# Patient Record
(demographics unavailable — no encounter records)

---

## 2024-11-14 NOTE — DISCUSSION/SUMMARY
[Improving] : improving [GERD] : gastroesophageal reflux disease [Hyperlipidemia] : hyperlipidemia [Lipids Test Panel] : a fasting lipid profile [Hypertension] : hypertension [Stable] : stable [Responding to Treatment] : responding to treatment [Echocardiogram] : echocardiogram [None] : There are no changes in medication management [Patient] : the patient [de-identified] : declines stress testing [FreeTextEntry2] : pt with aide

## 2024-11-14 NOTE — PHYSICAL EXAM
[General Appearance - Well Developed] : well developed [Normal Appearance] : normal appearance [Well Groomed] : well groomed [General Appearance - Well Nourished] : well nourished [No Deformities] : no deformities [General Appearance - In No Acute Distress] : no acute distress [Normal Conjunctiva] : the conjunctiva exhibited no abnormalities [Eyelids - No Xanthelasma] : the eyelids demonstrated no xanthelasmas [Normal Oral Mucosa] : normal oral mucosa [No Oral Pallor] : no oral pallor [No Oral Cyanosis] : no oral cyanosis [Normal Jugular Venous A Waves Present] : normal jugular venous A waves present [Normal Jugular Venous V Waves Present] : normal jugular venous V waves present [No Jugular Venous Bueno A Waves] : no jugular venous bueno A waves [Respiration, Rhythm And Depth] : normal respiratory rhythm and effort [Exaggerated Use Of Accessory Muscles For Inspiration] : no accessory muscle use [Auscultation Breath Sounds / Voice Sounds] : lungs were clear to auscultation bilaterally [Abdomen Soft] : soft [Abdomen Tenderness] : non-tender [Abdomen Mass (___ Cm)] : no abdominal mass palpated [Abnormal Walk] : normal gait [Gait - Sufficient For Exercise Testing] : the gait was sufficient for exercise testing [Nail Clubbing] : no clubbing of the fingernails [Cyanosis, Localized] : no localized cyanosis [Petechial Hemorrhages (___cm)] : no petechial hemorrhages [Skin Color & Pigmentation] : normal skin color and pigmentation [] : no rash [No Venous Stasis] : no venous stasis [Skin Lesions] : no skin lesions [No Skin Ulcers] : no skin ulcer [No Xanthoma] : no  xanthoma was observed [Oriented To Time, Place, And Person] : oriented to person, place, and time [Affect] : the affect was normal [Mood] : the mood was normal [No Anxiety] : not feeling anxious [Normal Rate] : normal [Normal S1] : normal S1 [Normal S2] : normal S2 [No Murmur] : no murmurs heard [2+] : left 2+ [No Abnormalities] : the abdominal aorta was not enlarged and no bruit was heard [No Pitting Edema] : no pitting edema present [S3] : no S3 [S4] : no S4 [Right Carotid Bruit] : no bruit heard over the right carotid [Left Carotid Bruit] : no bruit heard over the left carotid [Right Femoral Bruit] : no bruit heard over the right femoral artery [Left Femoral Bruit] : no bruit heard over the left femoral artery

## 2024-11-14 NOTE — REASON FOR VISIT
[Follow-Up - Clinic] : a clinic follow-up of [Abnormal ECG] : an abnormal ECG [Hypertension] : hypertension [FreeTextEntry2] : pt h/o gerd, gi/epigastric upset after acidic foods, after salty foods,in past, helps after burp, no recent complaints, fatigue with generic toprol, no new sx, pt s/p mechanical fall, leg surgery, now s/p hip surgery, feels well [FreeTextEntry1] :  no cp or sob

## 2024-11-14 NOTE — DISCUSSION/SUMMARY
[Improving] : improving [GERD] : gastroesophageal reflux disease [Hyperlipidemia] : hyperlipidemia [Lipids Test Panel] : a fasting lipid profile [Hypertension] : hypertension [Stable] : stable [Responding to Treatment] : responding to treatment [Echocardiogram] : echocardiogram [None] : There are no changes in medication management [Patient] : the patient [de-identified] : declines stress testing [FreeTextEntry2] : pt with aide

## 2024-12-18 NOTE — HISTORY OF PRESENT ILLNESS
[FreeTextEntry1] : f/u biopsy. No complaints. "All better." [de-identified] : Compliant with wound care. mellissa Trent.

## 2024-12-18 NOTE — ASSESSMENT
[FreeTextEntry1] : Alert, oriented, well, pleasant.  Significant improvement with absence of thick keratotic plaques. Pink healing wounds. No lymphadenopathy. SCC treatment options. Did not want to hear them. "Do I have to treat this? I'm 92." Discussed desiccation and curettage.  "Don't tell me anything else." Side effects discussed with patient.  Agrees to "simple" treatment but after the holidays. Will schedule appointment for January. Will treat 1/2 of area then treat the remainder 1 month later.

## 2025-02-06 NOTE — ASSESSMENT
[FreeTextEntry1] : Alert, oriented, well, pleasant.  Well healing scar. No lymphadenopathy, left forehead/scalp. Medial superior edge in hairline is 3x7mm clean yellow erosion with trapped hair. Granulation tissue. Cleansed with peroxide.  Debrided and hair cut back and away from wound. Aluminum chloride application. discontinue petrolatum. start benzoyl peroxide cream once per day for 1 month. f/u 1 month.

## 2025-03-06 NOTE — ASSESSMENT
[FreeTextEntry1] : Alert, oriented, well, pleasant.  pink scar left forehead without evidence recurrence or erosion. No lymphadenopathy. Granulation tissue resolved. discontinue topical treatments.  f/u 6 months for skin check.

## 2025-03-06 NOTE — HISTORY OF PRESENT ILLNESS
[FreeTextEntry1] : f/u scc left forehead. Denies pain or swelling after d&c. Erosion 1 month ago. "All gone." With son.

## 2025-03-10 NOTE — ASSESSMENT
[FreeTextEntry1] : 91 yo F PMH HTN, HLD, Vitamin D def, DM2, GERD, osteoporosis presenting today for follow-up.

## 2025-03-10 NOTE — HISTORY OF PRESENT ILLNESS
[FreeTextEntry1] : follow-up   [de-identified] : 93 yo F PMH HTN, HLD, Vitamin D def, DM2, GERD, osteoporosis presenting today for follow-up.    She is accompanied by her daughter, Ariadne.   She has seasonal allergies.   She has follow-up with ophthalmologist today for changes in vision.   She has been doing PT which is helping.

## 2025-03-10 NOTE — PHYSICAL EXAM
[Normal] : no acute distress, well nourished, well developed and well-appearing [Normal Oropharynx] : the oropharynx was normal [No Respiratory Distress] : no respiratory distress  [Normal Rate] : normal rate

## 2025-03-10 NOTE — ASSESSMENT
[FreeTextEntry1] : 93 yo F PMH HTN, HLD, Vitamin D def, DM2, GERD, osteoporosis presenting today for follow-up.

## 2025-03-10 NOTE — PLAN
[FreeTextEntry1] : #HCM -she wishes to continue w/ mammograms/sonogram (wants sonogram instead as may not be able to tolerate standing for mammogram)- script provided  #HTN -controlled -f/u CMP  -continue current regimen: irbesartan 300 mg - HCTZ 12.5 mg, amlodipine 10 mg QD, Toprol XL 25 mg QD -she will discuss with cardio regarding water pill  #HLD -lipid panel wnl -continue atorvastatin 10 mg QD  -follow-up for annual in Sept -discussed option of seeing an allergist

## 2025-03-10 NOTE — HISTORY OF PRESENT ILLNESS
[FreeTextEntry1] : follow-up   [de-identified] : 91 yo F PMH HTN, HLD, Vitamin D def, DM2, GERD, osteoporosis presenting today for follow-up.    She is accompanied by her daughter, Ariadne.   She has seasonal allergies.   She has follow-up with ophthalmologist today for changes in vision.   She has been doing PT which is helping.

## 2025-05-15 NOTE — DISCUSSION/SUMMARY
[Improving] : improving [GERD] : gastroesophageal reflux disease [Hyperlipidemia] : hyperlipidemia [Lipids Test Panel] : a fasting lipid profile [Hypertension] : hypertension [Stable] : stable [Responding to Treatment] : responding to treatment [Echocardiogram] : echocardiogram [None] : There are no changes in medication management [Patient] : the patient [de-identified] : declines stress testing [FreeTextEntry2] : pt with aide